# Patient Record
Sex: FEMALE
[De-identification: names, ages, dates, MRNs, and addresses within clinical notes are randomized per-mention and may not be internally consistent; named-entity substitution may affect disease eponyms.]

---

## 2024-10-03 PROBLEM — F64.9 GENDER DYSPHORIA: Status: ACTIVE | Noted: 2024-10-03

## 2024-10-03 PROBLEM — Z00.00 ENCOUNTER FOR PREVENTIVE HEALTH EXAMINATION: Status: ACTIVE | Noted: 2024-10-03

## 2024-10-04 ENCOUNTER — APPOINTMENT (OUTPATIENT)
Dept: PLASTIC SURGERY | Facility: CLINIC | Age: 34
End: 2024-10-04
Payer: COMMERCIAL

## 2024-10-04 DIAGNOSIS — F64.9 GENDER IDENTITY DISORDER, UNSPECIFIED: ICD-10-CM

## 2024-10-04 PROCEDURE — 99204 OFFICE O/P NEW MOD 45 MIN: CPT

## 2024-10-04 RX ORDER — ESTRADIOL 10 UG/1
TABLET, FILM COATED VAGINAL
Refills: 0 | Status: ACTIVE | COMMUNITY

## 2024-10-04 NOTE — HISTORY OF PRESENT ILLNESS
[FreeTextEntry1] : JOSELYN JASON is a 34 year old male to female transgender patient with a history of gender dysphoria. She seeks consultation for facial feminization surgery and is followed by a Transgender team. She reports that she has been socially transitioning since 2022. She has been medically transitioning since 2022. She reports significant mental health history.  She is currently in transgender care at [facility]. She denies cigarette use, drinks some alcohol, and occasionally smokes marijuana. She expresses understanding that she will need to abstain from smoking for 6 weeks before and 6 weeks after surgery. Patient denies history of previous gender affirming surgeries and gender affirming procedures such as Botox, silicone, fillers, liposuction and fat grafting. Patient denies personal and family medical history of clots, strokes, bleeding disorders and anesthesia problems. She reports that the male appearance of her face exacerbate her gender dysphoria and cause misgendering.

## 2024-10-04 NOTE — HISTORY OF PRESENT ILLNESS
[FreeTextEntry1] : JOSELYN JASON is a 34 year old male to female transgender patient with a history of gender dysphoria. She seeks consultation for facial feminization surgery and is followed by a Transgender team. She reports that she has been socially transitioning since 2022. She has been medically transitioning since 2022. She reports significant mental health history.  She is currently in transgender care at [facility]. She denies cigarette use, drinks some alcohol, and occasionally smokes marijuana. She expresses understanding that she will need to abstain from smoking for 6 weeks before and 6 weeks after surgery. Patient denies history of previous gender affirming surgeries and gender affirming procedures such as Botox, silicone, fillers, liposuction and fat grafting. Patient denies personal and family medical history of clots, strokes, bleeding disorders and anesthesia problems. She reports that the male appearance of her face exacerbate her gender dysphoria and cause misgendering.

## 2024-10-22 ENCOUNTER — NON-APPOINTMENT (OUTPATIENT)
Age: 34
End: 2024-10-22

## 2024-12-13 ENCOUNTER — TRANSCRIPTION ENCOUNTER (OUTPATIENT)
Age: 34
End: 2024-12-13

## 2024-12-13 ENCOUNTER — OUTPATIENT (OUTPATIENT)
Dept: OUTPATIENT SERVICES | Facility: HOSPITAL | Age: 34
LOS: 1 days | End: 2024-12-13
Payer: COMMERCIAL

## 2024-12-13 ENCOUNTER — APPOINTMENT (OUTPATIENT)
Dept: CT IMAGING | Facility: CLINIC | Age: 34
End: 2024-12-13

## 2024-12-13 PROCEDURE — 70486 CT MAXILLOFACIAL W/O DYE: CPT | Mod: 26

## 2025-04-25 RX ORDER — PROGESTERONE 200 MG/1
2 CAPSULE ORAL
Refills: 0 | DISCHARGE

## 2025-04-25 RX ORDER — FINASTERIDE 1 MG/1
1 TABLET, FILM COATED ORAL
Refills: 0 | DISCHARGE

## 2025-04-25 RX ORDER — METHYLPHENIDATE HCL 20 MG
1 TABLET, EXTENDED RELEASE ORAL
Refills: 0 | DISCHARGE

## 2025-04-25 RX ORDER — ESTRADIOL 0.05MG/24H
20 PATCH, TRANSDERMAL SEMIWEEKLY TRANSDERMAL
Refills: 0 | DISCHARGE

## 2025-04-25 NOTE — ASU PATIENT PROFILE, ADULT - NSICDXPASTMEDICALHX_GEN_ALL_CORE_FT
Pt switched to heated high flow O2 with sats of 96% and breathing easy. Nitro discontinued and home medications given as directed.  Will continue to monitor closely.    PAST MEDICAL HISTORY:  History of gender dysphoria      PAST MEDICAL HISTORY:  Adult ADHD     History of gender dysphoria

## 2025-04-28 ENCOUNTER — TRANSCRIPTION ENCOUNTER (OUTPATIENT)
Age: 35
End: 2025-04-28

## 2025-04-28 ENCOUNTER — APPOINTMENT (OUTPATIENT)
Dept: PLASTIC SURGERY | Facility: HOSPITAL | Age: 35
End: 2025-04-28
Payer: COMMERCIAL

## 2025-04-28 ENCOUNTER — INPATIENT (INPATIENT)
Facility: HOSPITAL | Age: 35
LOS: 0 days | Discharge: HOME | DRG: 876 | End: 2025-04-29
Attending: SURGERY | Admitting: SURGERY
Payer: MEDICAID

## 2025-04-28 VITALS
OXYGEN SATURATION: 98 % | HEART RATE: 80 BPM | SYSTOLIC BLOOD PRESSURE: 161 MMHG | TEMPERATURE: 98 F | WEIGHT: 194.01 LBS | DIASTOLIC BLOOD PRESSURE: 93 MMHG | RESPIRATION RATE: 16 BRPM | HEIGHT: 71 IN

## 2025-04-28 DIAGNOSIS — K44.9 DIAPHRAGMATIC HERNIA WITHOUT OBSTRUCTION OR GANGRENE: Chronic | ICD-10-CM

## 2025-04-28 DIAGNOSIS — Z90.89 ACQUIRED ABSENCE OF OTHER ORGANS: Chronic | ICD-10-CM

## 2025-04-28 PROCEDURE — 15824 RHYTIDECTOMY FOREHEAD: CPT

## 2025-04-28 PROCEDURE — 21122 GENIOP SLDG OSTEOT 2/>: CPT

## 2025-04-28 PROCEDURE — 21139 RDCTJ FOREHEAD CNTRG&SETBACK: CPT

## 2025-04-28 PROCEDURE — 21172 RCNST SUPR-LAT ORB RM&LW FHD: CPT | Mod: 50

## 2025-04-28 PROCEDURE — 15769 GRFG AUTOL SOFT TISS DIR EXC: CPT

## 2025-04-28 PROCEDURE — 21209 REDUCTION OF FACIAL BONES: CPT | Mod: 50

## 2025-04-28 PROCEDURE — 15773 GRFG AUTOL FAT LIPO 25 CC/<: CPT

## 2025-04-28 DEVICE — IMP IPS MARKING GUIDE CRANIAL TRANSFORM EXTRA LP: Type: IMPLANTABLE DEVICE | Status: FUNCTIONAL

## 2025-04-28 DEVICE — SCREW MAXDRIVE 1 LVL 2X7MM: Type: IMPLANTABLE DEVICE | Status: FUNCTIONAL

## 2025-04-28 DEVICE — IMP IPS CUTTING GUIDE TRANSFORM LP XS: Type: IMPLANTABLE DEVICE | Status: FUNCTIONAL

## 2025-04-28 DEVICE — IMP IPS CUTTING GUIDE W/PLANNING TRANSFORM LP: Type: IMPLANTABLE DEVICE | Status: FUNCTIONAL

## 2025-04-28 RX ORDER — SODIUM CHLORIDE 9 G/1000ML
1000 INJECTION, SOLUTION INTRAVENOUS
Refills: 0 | Status: DISCONTINUED | OUTPATIENT
Start: 2025-04-28 | End: 2025-04-28

## 2025-04-28 RX ORDER — ESTRADIOL 0.05MG/24H
10 PATCH, TRANSDERMAL SEMIWEEKLY TRANSDERMAL
Refills: 0 | DISCHARGE

## 2025-04-28 RX ORDER — IBUPROFEN 200 MG
1 TABLET ORAL
Qty: 28 | Refills: 0
Start: 2025-04-28

## 2025-04-28 RX ORDER — CALCIUM CARBONATE 750 MG/1
1 TABLET ORAL EVERY 4 HOURS
Refills: 0 | Status: DISCONTINUED | OUTPATIENT
Start: 2025-04-28 | End: 2025-04-28

## 2025-04-28 RX ORDER — HYPROMELLOSE 0.4 %
1 DROPS OPHTHALMIC (EYE)
Refills: 0 | Status: DISCONTINUED | OUTPATIENT
Start: 2025-04-28 | End: 2025-04-28

## 2025-04-28 RX ORDER — IBUPROFEN 200 MG
400 TABLET ORAL EVERY 6 HOURS
Refills: 0 | Status: DISCONTINUED | OUTPATIENT
Start: 2025-04-28 | End: 2025-04-28

## 2025-04-28 RX ORDER — DIPHENHYDRAMINE HCL 12.5MG/5ML
25 ELIXIR ORAL EVERY 4 HOURS
Refills: 0 | Status: DISCONTINUED | OUTPATIENT
Start: 2025-04-28 | End: 2025-04-28

## 2025-04-28 RX ORDER — DIAZEPAM 2 MG/1
5 TABLET ORAL EVERY 6 HOURS
Refills: 0 | Status: DISCONTINUED | OUTPATIENT
Start: 2025-04-28 | End: 2025-04-29

## 2025-04-28 RX ORDER — HYPROMELLOSE 0.4 %
1 DROPS OPHTHALMIC (EYE)
Refills: 0 | Status: DISCONTINUED | OUTPATIENT
Start: 2025-04-28 | End: 2025-04-29

## 2025-04-28 RX ORDER — ACETAMINOPHEN 500 MG/5ML
975 LIQUID (ML) ORAL EVERY 6 HOURS
Refills: 0 | Status: DISCONTINUED | OUTPATIENT
Start: 2025-04-28 | End: 2025-04-28

## 2025-04-28 RX ORDER — OXYCODONE HYDROCHLORIDE 30 MG/1
10 TABLET ORAL EVERY 4 HOURS
Refills: 0 | Status: DISCONTINUED | OUTPATIENT
Start: 2025-04-28 | End: 2025-04-28

## 2025-04-28 RX ORDER — SODIUM CHLORIDE 9 G/1000ML
1000 INJECTION, SOLUTION INTRAVENOUS
Refills: 0 | Status: DISCONTINUED | OUTPATIENT
Start: 2025-04-28 | End: 2025-04-29

## 2025-04-28 RX ORDER — PROGESTERONE 200 MG/1
2 CAPSULE ORAL
Refills: 0 | DISCHARGE

## 2025-04-28 RX ORDER — SENNA 187 MG
2 TABLET ORAL AT BEDTIME
Refills: 0 | Status: DISCONTINUED | OUTPATIENT
Start: 2025-04-28 | End: 2025-04-28

## 2025-04-28 RX ORDER — OXYCODONE HYDROCHLORIDE 30 MG/1
5 TABLET ORAL EVERY 4 HOURS
Refills: 0 | Status: DISCONTINUED | OUTPATIENT
Start: 2025-04-28 | End: 2025-04-29

## 2025-04-28 RX ORDER — MELATONIN 5 MG
3 TABLET ORAL AT BEDTIME
Refills: 0 | Status: DISCONTINUED | OUTPATIENT
Start: 2025-04-28 | End: 2025-04-29

## 2025-04-28 RX ORDER — CEFAZOLIN SODIUM IN 0.9 % NACL 3 G/100 ML
2000 INTRAVENOUS SOLUTION, PIGGYBACK (ML) INTRAVENOUS EVERY 8 HOURS
Refills: 0 | Status: DISCONTINUED | OUTPATIENT
Start: 2025-04-28 | End: 2025-04-29

## 2025-04-28 RX ORDER — METOCLOPRAMIDE HCL 10 MG
10 TABLET ORAL EVERY 8 HOURS
Refills: 0 | Status: DISCONTINUED | OUTPATIENT
Start: 2025-04-28 | End: 2025-04-28

## 2025-04-28 RX ORDER — ACETAMINOPHEN 500 MG/5ML
1000 LIQUID (ML) ORAL ONCE
Refills: 0 | Status: COMPLETED | OUTPATIENT
Start: 2025-04-28 | End: 2025-04-28

## 2025-04-28 RX ORDER — METOCLOPRAMIDE HCL 10 MG
10 TABLET ORAL EVERY 6 HOURS
Refills: 0 | Status: DISCONTINUED | OUTPATIENT
Start: 2025-04-28 | End: 2025-04-29

## 2025-04-28 RX ORDER — OXYCODONE HYDROCHLORIDE AND ACETAMINOPHEN 10; 325 MG/1; MG/1
1 TABLET ORAL
Qty: 15 | Refills: 0
Start: 2025-04-28

## 2025-04-28 RX ORDER — METHADONE HCL 10 MG
20 TABLET ORAL ONCE
Refills: 0 | Status: DISCONTINUED | OUTPATIENT
Start: 2025-04-28 | End: 2025-04-28

## 2025-04-28 RX ORDER — APREPITANT 40 MG/1
40 CAPSULE ORAL ONCE
Refills: 0 | Status: COMPLETED | OUTPATIENT
Start: 2025-04-28 | End: 2025-04-28

## 2025-04-28 RX ORDER — KETOROLAC TROMETHAMINE 30 MG/ML
30 INJECTION, SOLUTION INTRAMUSCULAR; INTRAVENOUS EVERY 6 HOURS
Refills: 0 | Status: DISCONTINUED | OUTPATIENT
Start: 2025-04-28 | End: 2025-04-28

## 2025-04-28 RX ORDER — DEXAMETHASONE 0.5 MG/1
10 TABLET ORAL EVERY 12 HOURS
Refills: 0 | Status: DISCONTINUED | OUTPATIENT
Start: 2025-04-28 | End: 2025-04-29

## 2025-04-28 RX ORDER — CEFAZOLIN SODIUM IN 0.9 % NACL 3 G/100 ML
2000 INTRAVENOUS SOLUTION, PIGGYBACK (ML) INTRAVENOUS EVERY 8 HOURS
Refills: 0 | Status: DISCONTINUED | OUTPATIENT
Start: 2025-04-28 | End: 2025-04-28

## 2025-04-28 RX ORDER — OXYCODONE HYDROCHLORIDE 30 MG/1
5 TABLET ORAL EVERY 4 HOURS
Refills: 0 | Status: DISCONTINUED | OUTPATIENT
Start: 2025-04-28 | End: 2025-04-28

## 2025-04-28 RX ORDER — OXYCODONE HYDROCHLORIDE 30 MG/1
10 TABLET ORAL EVERY 4 HOURS
Refills: 0 | Status: DISCONTINUED | OUTPATIENT
Start: 2025-04-28 | End: 2025-04-29

## 2025-04-28 RX ORDER — ONDANSETRON HCL/PF 4 MG/2 ML
4 VIAL (ML) INJECTION EVERY 6 HOURS
Refills: 0 | Status: DISCONTINUED | OUTPATIENT
Start: 2025-04-28 | End: 2025-04-28

## 2025-04-28 RX ORDER — ACETAMINOPHEN 500 MG/5ML
1000 LIQUID (ML) ORAL EVERY 6 HOURS
Refills: 0 | Status: DISCONTINUED | OUTPATIENT
Start: 2025-04-28 | End: 2025-04-28

## 2025-04-28 RX ORDER — ONDANSETRON HCL/PF 4 MG/2 ML
4 VIAL (ML) INJECTION EVERY 6 HOURS
Refills: 0 | Status: DISCONTINUED | OUTPATIENT
Start: 2025-04-28 | End: 2025-04-29

## 2025-04-28 RX ORDER — HYDROMORPHONE/SOD CHLOR,ISO/PF 2 MG/10 ML
0.5 SYRINGE (ML) INJECTION EVERY 4 HOURS
Refills: 0 | Status: DISCONTINUED | OUTPATIENT
Start: 2025-04-28 | End: 2025-04-28

## 2025-04-28 RX ORDER — ACETAMINOPHEN 500 MG/5ML
975 LIQUID (ML) ORAL EVERY 6 HOURS
Refills: 0 | Status: DISCONTINUED | OUTPATIENT
Start: 2025-04-28 | End: 2025-04-29

## 2025-04-28 RX ORDER — METHYLPHENIDATE HCL 20 MG
1 TABLET, EXTENDED RELEASE ORAL
Refills: 0 | DISCHARGE

## 2025-04-28 RX ORDER — DIPHENHYDRAMINE HCL 12.5MG/5ML
25 ELIXIR ORAL EVERY 6 HOURS
Refills: 0 | Status: DISCONTINUED | OUTPATIENT
Start: 2025-04-28 | End: 2025-04-29

## 2025-04-28 RX ORDER — AMOXICILLIN AND CLAVULANATE POTASSIUM 500; 125 MG/1; MG/1
500 TABLET, FILM COATED ORAL
Qty: 10 | Refills: 0
Start: 2025-04-28 | End: 2025-05-02

## 2025-04-28 RX ORDER — ACETAMINOPHEN 500 MG/5ML
650 LIQUID (ML) ORAL ONCE
Refills: 0 | Status: COMPLETED | OUTPATIENT
Start: 2025-04-28 | End: 2025-04-28

## 2025-04-28 RX ADMIN — APREPITANT 40 MILLIGRAM(S): 40 CAPSULE ORAL at 07:22

## 2025-04-28 RX ADMIN — Medication 15 MILLILITER(S): at 17:13

## 2025-04-28 RX ADMIN — Medication 650 MILLIGRAM(S): at 07:22

## 2025-04-28 RX ADMIN — Medication 1000 MILLIGRAM(S): at 13:08

## 2025-04-28 RX ADMIN — Medication 100 MILLIGRAM(S): at 15:49

## 2025-04-28 RX ADMIN — SODIUM CHLORIDE 75 MILLILITER(S): 9 INJECTION, SOLUTION INTRAVENOUS at 13:10

## 2025-04-28 RX ADMIN — Medication 650 MILLIGRAM(S): at 07:37

## 2025-04-28 RX ADMIN — Medication 400 MILLIGRAM(S): at 12:34

## 2025-04-28 RX ADMIN — Medication 975 MILLIGRAM(S): at 17:13

## 2025-04-28 RX ADMIN — Medication 4 MILLIGRAM(S): at 16:07

## 2025-04-28 RX ADMIN — DEXAMETHASONE 102 MILLIGRAM(S): 0.5 TABLET ORAL at 17:13

## 2025-04-28 NOTE — DISCHARGE NOTE PROVIDER - NSDCFUADDINST_GEN_ALL_CORE_FT
MEDICATIONS:  >> A prescription for pain medication was e-prescribed to your pharmacy. Please take this as needed for severe pain.   >> Do not drive while taking the prescribed pain medication  >> Do not take the pain medication on an empty stomach, this may make you nauseous  >> Constipation is not uncommon when taking prescription pain medication. You may take an over the counter stool softener like Dulcolax or Sennakot to help with this.   >> You may take over the counter pain medication such as tylenol (acetaminophen) or Advil (ibuprofen) for pain.   >> Please use peridex rinse - swish and spit twice daily for 7 days.     ACTIVITY:  >> No bending or heavy lifting. Keep your head above the level of your heart. At your first post-op visit we will assess how you are doing and will adjust the restrictions as needed.   >> Sleep with head elevated on at least 2 pillows.     DIET:  >> Intraoral incisions: Clear liquid diet for first 48 hours then may advance to full liquid diet for 7 days. Then advance to soft diet. Avoid overly hot, cold, spicy, or acidic foods.   >> Do not drink alcohol in the immediate postoperative period and while taking prescription pain medication.     WOUND CARE:  >> Gently brush teeth and keep mouth clean after eating. At your first postop visit we will assess the wound and instruct you of any care needed.   >> Do not get your face wet, but you can wash your hair and the rest of your body. If you have a nasal splint - DO NOT GET THIS WET.   >> You may gently apply ice packs, or frozen peas to the nose and eye regions. Apply this for the first 48-72 hours. 20 minutes on and 20 minutes off. If you have a nasal splint, DO NOT apply ice to your nose.   >> Keep nasal splint on until your next postop visit, do not get this wet. Change gauze under nose as needed.  Nose will bleed over the next day or two.  This is normal.  It will be a mix of blood and mucus.  If you are noticing bleeding that does not stop or is saturating more than 2-3 gauzes per hour please call our office.   >> If you were given a facial/chin garment (Jaw Bra), please wear this 24/7 until your first postop visit.      Contact us with any questions or concerns. Please follow up with Dr. Mora within 1 week of discharge from the hospital. You may call [phone] to schedule an appointment.

## 2025-04-28 NOTE — DISCHARGE NOTE PROVIDER - NSDCMRMEDTOKEN_GEN_ALL_CORE_FT
Augmentin 500 mg-125 mg oral tablet: 500 milligram(s) orally 2 times a day  Concerta 54 mg/24 hr oral tablet, extended release: 1 tab(s) orally once a day  estradiol valerate 10 mg/mL intramuscular solution: 10 milligram(s) intramuscularly once a week  ibuprofen 600 mg oral tablet: 1 tab(s) orally every 6 hours  minoxidil 10 mg oral tablet: 1 tab(s) orally once a day  Percocet 5 mg-325 mg oral tablet: 1 tab(s) orally every 6 hours as needed for  severe pain MDD: 4  Peridex 0.12% mucous membrane liquid: 15 milliliter(s) orally 3 times a day  progesterone 100 mg oral capsule: 2 cap(s) orally once a day

## 2025-04-28 NOTE — DISCHARGE NOTE PROVIDER - NSDCFUSCHEDAPPT_GEN_ALL_CORE_FT
Montana Mora  [facility] Physician [facility]  [address]  Scheduled Appointment: 04/28/2025    Purvi Valdovinos  [facility]  [address] [address]  Scheduled Appointment: 05/07/2025

## 2025-04-28 NOTE — DISCHARGE NOTE PROVIDER - NSDCCPCAREPLAN_GEN_ALL_CORE_FT
PRINCIPAL DISCHARGE DIAGNOSIS  Diagnosis: H/O gender dysphoria  Assessment and Plan of Treatment:

## 2025-04-28 NOTE — DISCHARGE NOTE PROVIDER - HOSPITAL COURSE
Wilfredo was admitted to [facility] on 04/28. The patient underwent a facial feminization procedure. Post-operatively the patient was sent to the PACU, the patient was hemodynamically stable and sent to a surgical floor. Drains were placed intraoperatively, which were removed before discharge.  The patient was pain was controlled by IV pain medications transitioned to po narcotics. The patient was advanced to full liquid diet and tolerated it well. The patient was hemodynamically stable and placed on home medications. The patient was told to follow up with Dr. Mora in 1 week and had no other issues.

## 2025-04-28 NOTE — DISCHARGE NOTE PROVIDER - CARE PROVIDER_API CALL
Montana Mora  Plastic Surgery  [address], [address]  [place] [address]  Phone: [phone]  Fax: [phone]  Follow Up Time: 1 week

## 2025-04-28 NOTE — BRIEF OPERATIVE NOTE - OPERATION/FINDINGS
FFS- frontal sinus, orbital contouring, fat grafting, genioplasty, mandibular angle contouring, tracheal shave, lip lift, lip augmentation

## 2025-04-29 ENCOUNTER — TRANSCRIPTION ENCOUNTER (OUTPATIENT)
Age: 35
End: 2025-04-29

## 2025-04-29 VITALS
SYSTOLIC BLOOD PRESSURE: 144 MMHG | OXYGEN SATURATION: 95 % | DIASTOLIC BLOOD PRESSURE: 84 MMHG | TEMPERATURE: 98 F | HEART RATE: 79 BPM | RESPIRATION RATE: 18 BRPM

## 2025-04-29 PROCEDURE — C1889: CPT

## 2025-04-29 RX ORDER — FINASTERIDE 1 MG/1
1 TABLET, FILM COATED ORAL
Refills: 0 | DISCHARGE

## 2025-04-29 RX ORDER — ONDANSETRON HCL/PF 4 MG/2 ML
1 VIAL (ML) INJECTION
Qty: 25 | Refills: 0
Start: 2025-04-29

## 2025-04-29 RX ADMIN — Medication 100 MILLIGRAM(S): at 00:55

## 2025-04-29 RX ADMIN — Medication 975 MILLIGRAM(S): at 11:02

## 2025-04-29 RX ADMIN — Medication 975 MILLIGRAM(S): at 07:25

## 2025-04-29 RX ADMIN — Medication 15 MILLILITER(S): at 06:28

## 2025-04-29 RX ADMIN — OXYCODONE HYDROCHLORIDE 10 MILLIGRAM(S): 30 TABLET ORAL at 07:25

## 2025-04-29 RX ADMIN — Medication 100 MILLIGRAM(S): at 08:29

## 2025-04-29 RX ADMIN — OXYCODONE HYDROCHLORIDE 10 MILLIGRAM(S): 30 TABLET ORAL at 06:42

## 2025-04-29 RX ADMIN — Medication 975 MILLIGRAM(S): at 00:55

## 2025-04-29 RX ADMIN — Medication 975 MILLIGRAM(S): at 06:25

## 2025-04-29 RX ADMIN — DEXAMETHASONE 102 MILLIGRAM(S): 0.5 TABLET ORAL at 06:24

## 2025-04-29 NOTE — PROGRESS NOTE ADULT - ASSESSMENT
POD1 from FFS with Dr. Mora    Plan:  - dc home today  - scripts sent to [facility]  - activity as tolerated  - passed TOV

## 2025-04-29 NOTE — DISCHARGE NOTE NURSING/CASE MANAGEMENT/SOCIAL WORK - FINANCIAL ASSISTANCE
[facility] provides services at a reduced cost to those who are determined to be eligible through [facility]’s financial assistance program. Information regarding [facility]’s financial assistance program can be found by going to [email or web address] or by calling [phone].

## 2025-04-29 NOTE — DISCHARGE NOTE NURSING/CASE MANAGEMENT/SOCIAL WORK - PATIENT PORTAL LINK FT
You can access the FollowMyHealth Patient Portal offered by [facility] by registering at the following website: [email or web address]. By joining [facility]’s FollowMyHealth portal, you will also be able to view your health information using other applications (apps) compatible with our system.

## 2025-04-29 NOTE — PROGRESS NOTE ADULT - SUBJECTIVE AND OBJECTIVE BOX
Plastic Surgery    SUBJECTIVE: Pt seen and examined on rounds with team. NAEON.      VITALS  T(C): 36.8 (04-29-25 @ 05:46), Max: 36.8 (04-29-25 @ 05:46)  HR: 56 (04-29-25 @ 05:46) (56 - 94)  BP: 133/73 (04-29-25 @ 05:46) (133/73 - 162/94)  RR: 12 (04-29-25 @ 05:46) (12 - 19)  SpO2: 94% (04-29-25 @ 05:46) (94% - 100%)  CAPILLARY BLOOD GLUCOSE          Is/Os    04-28 @ 07:01  -  04-29 @ 06:57  --------------------------------------------------------  IN:    IV PiggyBack: 50 mL    Lactated Ringers: 1350 mL    Oral Fluid: 45 mL  Total IN: 1445 mL    OUT:    Bulb (mL): 71 mL    Voided (mL): 1700 mL  Total OUT: 1771 mL    Total NET: -326 mL        Physical exam  General: comfortable appearing  Pulm: breathing well on room air  HEENT: EOMI, incision cdi, no palpable collections.     LABS

## 2025-04-29 NOTE — DISCHARGE NOTE NURSING/CASE MANAGEMENT/SOCIAL WORK - NSDCPEFALRISK_GEN_ALL_CORE
For information on Fall & Injury Prevention, visit: [email or web address] OR  [email or web address] OR  https://www.cdc.gov/steadi/patient.html

## 2025-05-01 DIAGNOSIS — F64.9 GENDER IDENTITY DISORDER, UNSPECIFIED: ICD-10-CM

## 2025-05-07 ENCOUNTER — APPOINTMENT (OUTPATIENT)
Dept: PLASTIC SURGERY | Facility: CLINIC | Age: 35
End: 2025-05-07
Payer: COMMERCIAL

## 2025-05-07 DIAGNOSIS — F64.9 GENDER IDENTITY DISORDER, UNSPECIFIED: ICD-10-CM

## 2025-05-07 PROCEDURE — 99024 POSTOP FOLLOW-UP VISIT: CPT

## 2025-05-14 PROBLEM — Z86.59 PERSONAL HISTORY OF OTHER MENTAL AND BEHAVIORAL DISORDERS: Chronic | Status: ACTIVE | Noted: 2025-04-25

## 2025-05-28 ENCOUNTER — APPOINTMENT (OUTPATIENT)
Dept: PLASTIC SURGERY | Facility: CLINIC | Age: 35
End: 2025-05-28
Payer: COMMERCIAL

## 2025-05-28 DIAGNOSIS — F64.9 GENDER IDENTITY DISORDER, UNSPECIFIED: ICD-10-CM

## 2025-05-28 PROCEDURE — 99024 POSTOP FOLLOW-UP VISIT: CPT

## 2025-06-16 ENCOUNTER — NON-APPOINTMENT (OUTPATIENT)
Age: 35
End: 2025-06-16

## 2025-06-16 ENCOUNTER — APPOINTMENT (OUTPATIENT)
Dept: OTOLARYNGOLOGY | Facility: CLINIC | Age: 35
End: 2025-06-16
Payer: COMMERCIAL

## 2025-06-16 VITALS
HEART RATE: 85 BPM | OXYGEN SATURATION: 100 % | HEIGHT: 72 IN | DIASTOLIC BLOOD PRESSURE: 92 MMHG | SYSTOLIC BLOOD PRESSURE: 127 MMHG | TEMPERATURE: 97.8 F

## 2025-06-16 PROCEDURE — 99204 OFFICE O/P NEW MOD 45 MIN: CPT | Mod: 25

## 2025-06-16 PROCEDURE — 31579 LARYNGOSCOPY TELESCOPIC: CPT

## 2025-06-16 PROCEDURE — 69210 REMOVE IMPACTED EAR WAX UNI: CPT

## 2025-06-16 RX ORDER — PROGESTERONE 200 MG/1
CAPSULE ORAL
Refills: 0 | Status: ACTIVE | COMMUNITY

## 2025-06-16 RX ORDER — FINASTERIDE 1 MG/1
TABLET ORAL
Refills: 0 | Status: ACTIVE | COMMUNITY

## 2025-06-16 RX ORDER — METHYLPHENIDATE HYDROCHLORIDE 27 MG/1
TABLET, EXTENDED RELEASE ORAL
Refills: 0 | Status: ACTIVE | COMMUNITY

## 2025-06-16 RX ORDER — MINOXIDIL 5 %
SOLUTION, NON-ORAL TOPICAL
Refills: 0 | Status: ACTIVE | COMMUNITY

## 2025-06-16 RX ORDER — ESTRADIOL 10 UG/1
TABLET, FILM COATED VAGINAL
Refills: 0 | Status: ACTIVE | COMMUNITY

## (undated) DEVICE — ELCTR BOVIE PENCIL HANDPIECE ROCKER SWITCH 15FT

## (undated) DEVICE — MODEL CRANIUM CRYSTAL IPS LP

## (undated) DEVICE — BUR STRYKER EGG 4MM

## (undated) DEVICE — DRAPE INSTRUMENT POUCH 6.75" X 11"

## (undated) DEVICE — DRSG AQUAPLAST BLANK BLUSH 3 X 3"

## (undated) DEVICE — SAW BLADE STRYKER RECIPROCATING 27MMX0.38MM

## (undated) DEVICE — SUT VICRYL 2-0 27" CT-2 UNDYED

## (undated) DEVICE — COMPRESSION CHIN-NECK SMALL

## (undated) DEVICE — SUT SILK 2-0 30" PSL

## (undated) DEVICE — Device

## (undated) DEVICE — RASP STRYKER LARGE TEAR CROSSCUT 14X7MM DISP

## (undated) DEVICE — SUT VICRYL 6-0 18" P-3 UNDYED

## (undated) DEVICE — DRAIN JACKSON PRATT 7MM FLAT FULL NO TROCAR

## (undated) DEVICE — MODEL SUPPLEMENTAL HALF IPS LP 00132

## (undated) DEVICE — VENODYNE/SCD SLEEVE CALF MEDIUM

## (undated) DEVICE — SUT MONOCRYL 4-0 27" PS-2 UNDYED

## (undated) DEVICE — DRSG TELFA 3 X 8

## (undated) DEVICE — DRSG STERISTRIPS 0.5 X 4"

## (undated) DEVICE — SUT PLAIN GUT FAST ABSORBING 5-0 PC-1

## (undated) DEVICE — MODEL IPS MANDIBLE CRYSTAL LP

## (undated) DEVICE — SUT VICRYL 3-0 27" SH

## (undated) DEVICE — ILLUMINATOR MINI VERSALIGHT EXTENDED FRAZIER TIP

## (undated) DEVICE — BEAVER BLADE MINI (ORANGE)

## (undated) DEVICE — PRESSURE INFUSOR BAG 1000ML

## (undated) DEVICE — NDL 18G BLUNT FILL PINK

## (undated) DEVICE — MODEL IPS SUPPLEMENTAL TRANSFORM HALF LP

## (undated) DEVICE — BLADE MANDIBULAR OSTEOTOMY

## (undated) DEVICE — DRSG CURITY GAUZE SPONGE 4 X 4" 12-PLY

## (undated) DEVICE — DRSG XEROFORM 5 X 9"

## (undated) DEVICE — ELCTR STRYKER NEPTUNE SMOKE EVACUATION PENCIL (GREEN)

## (undated) DEVICE — ZIMMER DERMACARRIER SKIN GRAFT MESH 8"

## (undated) DEVICE — SUT MONOCRYL 5-0 18" P-3 UNDYED

## (undated) DEVICE — BUR STRYKER CARBIDE CROSS CUT FISSURE 1.2MM

## (undated) DEVICE — DRSG MASTISOL

## (undated) DEVICE — RUBBERBAND STRL LTX FR 200/CA 3X1/8IN

## (undated) DEVICE — CATH NG SALEM SUMP 16FR

## (undated) DEVICE — WARMING BLANKET LOWER ADULT

## (undated) DEVICE — POSITIONER FOAM EGG CRATE ULNAR 2PCS (PINK)

## (undated) DEVICE — DRSG TUBE SPANDAGE 8 10YD

## (undated) DEVICE — DRSG KERLIX ROLL LG 4.5"

## (undated) DEVICE — PACK PLASTIC ORTHOGNATHIC

## (undated) DEVICE — MEDICATION LABELS W MARKER

## (undated) DEVICE — STRYKER COLORADO N-SERIES 3CM STRAIGHT